# Patient Record
Sex: MALE | Race: OTHER | NOT HISPANIC OR LATINO | Employment: OTHER | URBAN - METROPOLITAN AREA
[De-identification: names, ages, dates, MRNs, and addresses within clinical notes are randomized per-mention and may not be internally consistent; named-entity substitution may affect disease eponyms.]

---

## 2022-04-28 DIAGNOSIS — G20.A1 PARKINSON'S DISEASE: Primary | ICD-10-CM

## 2022-08-24 NOTE — PROGRESS NOTES
[facility] Neurology initial Office Visit Note    Initial Visit Date:  August 25, 2022  Current Visit Date:  08/25/2022    Chief Complaint:     Chief Complaint   Patient presents with    Tremors     No complaints       History of Present Illness:      This is a 67 y.o. Right hand dominant male with history of alcohol dependence who is referred for abnormal movements.    Age of Onset: 65 years old     Description of movements: right arm resting tremor along with use followed by left arm. Family noticed that he had been shuffling his feet for at least 2 years.     Exacerbation factors: denied     Relieving factors: calm.     Associated Symptoms:  Changes in smell or taste: No   Dysphagia: No   Voice/phonation change: Yes shaky    Changes in gait/falls:  Yes as above    Orthostatic hypotension:  No   Sleep disorder: Yes would toss and turn   Visual Hallucinations/Delusions: No   Mood disturbance: Yes Anxiety disorder.    Cognitive decline: No    Handwriting changes: Yes erratic     Trouble using a fork: No   Trouble using a spoon: No   Trouble drinking out of a cup: Yes occasional      Risk Factors:   Family history of movement disorder: No   Cardiovascular risk factors: Not Applicable   CNS Focal lesions:No   Mood disorder: Yes Anxiety disorder   Antipsychotics/Mood stabilizer/Antidepressant/Traditional antiemetic exposure: No   Stimulant use: No    Tobacco use: Yes 1/2 ppd since age of 22 years old. Quit smoking around 9 months ago.   Alcohol use: Yes History of alcohol dependence. Had been sober for 1 year    Recreational drug use: No    Herbicide exposure: No   Agent Orange exposure: Not Applicable     Medications:     Current:   Valium 5 mg BID     Prior:   non    Devices/Interventions:     DBS:   Gamma knife/Radiofrequency ablation:   PT/OT:     Labs:     No results found for this or any previous visit.    Studies:      Imaging:   MRI Brain:     Genetic Testing: Not Applicable      Review of Systems:     Review of  Systems   All other systems reviewed and are negative.      Physical Exams:     Vitals:    08/25/22 1059   BP: 131/67   Pulse: 90   Resp: 20   Temp: 98.2 °F (36.8 °C)       Physical Exam  Vitals and nursing note reviewed.   Constitutional:       Appearance: Normal appearance.   HENT:      Head: Normocephalic and atraumatic.      Nose: Nose normal.      Mouth/Throat:      Mouth: Mucous membranes are moist.      Pharynx: Oropharynx is clear.   Eyes:      Conjunctiva/sclera: Conjunctivae normal.   Cardiovascular:      Rate and Rhythm: Normal rate and regular rhythm.      Pulses: Normal pulses.      Heart sounds: Normal heart sounds.   Pulmonary:      Effort: Pulmonary effort is normal.      Breath sounds: Normal breath sounds.   Abdominal:      General: Abdomen is flat.      Palpations: Abdomen is soft.   Musculoskeletal:         General: Normal range of motion.      Cervical back: Normal range of motion.   Neurological:      Mental Status: He is alert.   Psychiatric:         Mood and Affect: Mood normal.         Comprehensive Neurological Exam:  Mental Status: Alert Oriented to Self, Date, and Place.   CN II - XII: KYLEIGH, No APD, Fundus wnl OU, VA grossly intact to finger counting at 6 ft, VFFC, No ptosis OU, EOMI without nystagmus, LT/Temp symmetric in CN V1-3 distribution, Hearing grossly intact, Face Symmetric, Tongue and Uvula midline, Trapezius symmetric bilateral. + Hypomimia.   Motor: rigidity in RUE>LUE, RLE tone, and bulk wnl throughout, Resting pill rolling tremor RUE>LUE, RLE, postural re-emergence tremor in RUE at 30 seconds,  5/5 to confrontation, Bradykinetic bilateral UE>LE, No pronator drift.   Sensory: LT, Proprioception, Vibration, PP, Temp symmetric.   Reflexes: 1+ throughout  Cerebellar: FNF wnl b/l  Romberg: Negative  Gait: decreased ground clearance and stride. Decreased arm swing bilaterally. 5 step en bloc turn.     Assessment:     This is a 67 y.o. Right hand dominant male with history of  alcohol dependence who is referred for PD    Problem List Items Addressed This Visit        Neuro    Parkinson's disease - Primary (Chronic)    Relevant Medications    carbidopa-levodopa  mg (SINEMET)  mg per tablet    carbidopa-levodopa  mg (SINEMET)  mg per tablet (Start on 10/7/2022)          Plan:     [] start Sinemet 25 mg - 100 mg 1 tablet TID with titration.   [] Patient and family counseled about possible worsening impulse control issues with initiation of dopaminergic medications, especially in patient with history of addictions.  Family is made aware of possible issues with worsening addictions and developing new additions.  Family is also made aware of possible visual hallucinations while on dopaminergic medications.    RTC 3 months    I have explained the treatment plan, diagnosis, and prognosis to patient. All questions are answered to the best of my knowledge.     Face to face time 45 minutes, including documentation, chart review, counseling, education, review of test results, relevant medical records, and coordination of care.       Shobha Chery MD   General Neurology  08/25/2022

## 2022-08-25 ENCOUNTER — OFFICE VISIT (OUTPATIENT)
Dept: NEUROLOGY | Facility: CLINIC | Age: 67
End: 2022-08-25
Payer: MEDICARE

## 2022-08-25 VITALS
HEIGHT: 63 IN | BODY MASS INDEX: 18.54 KG/M2 | HEART RATE: 90 BPM | WEIGHT: 104.63 LBS | RESPIRATION RATE: 20 BRPM | SYSTOLIC BLOOD PRESSURE: 131 MMHG | DIASTOLIC BLOOD PRESSURE: 67 MMHG | OXYGEN SATURATION: 97 % | TEMPERATURE: 98 F

## 2022-08-25 DIAGNOSIS — G20.A1 PARKINSON'S DISEASE: Primary | ICD-10-CM

## 2022-08-25 PROCEDURE — 3008F PR BODY MASS INDEX (BMI) DOCUMENTED: ICD-10-PCS | Mod: CPTII,,, | Performed by: PSYCHIATRY & NEUROLOGY

## 2022-08-25 PROCEDURE — 3008F BODY MASS INDEX DOCD: CPT | Mod: CPTII,,, | Performed by: PSYCHIATRY & NEUROLOGY

## 2022-08-25 PROCEDURE — 99214 OFFICE O/P EST MOD 30 MIN: CPT | Mod: PBBFAC | Performed by: PSYCHIATRY & NEUROLOGY

## 2022-08-25 PROCEDURE — 1126F PR PAIN SEVERITY QUANTIFIED, NO PAIN PRESENT: ICD-10-PCS | Mod: CPTII,,, | Performed by: PSYCHIATRY & NEUROLOGY

## 2022-08-25 PROCEDURE — 3288F FALL RISK ASSESSMENT DOCD: CPT | Mod: CPTII,,, | Performed by: PSYCHIATRY & NEUROLOGY

## 2022-08-25 PROCEDURE — 3078F PR MOST RECENT DIASTOLIC BLOOD PRESSURE < 80 MM HG: ICD-10-PCS | Mod: CPTII,,, | Performed by: PSYCHIATRY & NEUROLOGY

## 2022-08-25 PROCEDURE — 99204 OFFICE O/P NEW MOD 45 MIN: CPT | Mod: S$PBB,,, | Performed by: PSYCHIATRY & NEUROLOGY

## 2022-08-25 PROCEDURE — 1101F PT FALLS ASSESS-DOCD LE1/YR: CPT | Mod: CPTII,,, | Performed by: PSYCHIATRY & NEUROLOGY

## 2022-08-25 PROCEDURE — 1159F MED LIST DOCD IN RCRD: CPT | Mod: CPTII,,, | Performed by: PSYCHIATRY & NEUROLOGY

## 2022-08-25 PROCEDURE — 3075F PR MOST RECENT SYSTOLIC BLOOD PRESS GE 130-139MM HG: ICD-10-PCS | Mod: CPTII,,, | Performed by: PSYCHIATRY & NEUROLOGY

## 2022-08-25 PROCEDURE — 99204 PR OFFICE/OUTPT VISIT, NEW, LEVL IV, 45-59 MIN: ICD-10-PCS | Mod: S$PBB,,, | Performed by: PSYCHIATRY & NEUROLOGY

## 2022-08-25 PROCEDURE — 3075F SYST BP GE 130 - 139MM HG: CPT | Mod: CPTII,,, | Performed by: PSYCHIATRY & NEUROLOGY

## 2022-08-25 PROCEDURE — 3078F DIAST BP <80 MM HG: CPT | Mod: CPTII,,, | Performed by: PSYCHIATRY & NEUROLOGY

## 2022-08-25 PROCEDURE — 1101F PR PT FALLS ASSESS DOC 0-1 FALLS W/OUT INJ PAST YR: ICD-10-PCS | Mod: CPTII,,, | Performed by: PSYCHIATRY & NEUROLOGY

## 2022-08-25 PROCEDURE — 1159F PR MEDICATION LIST DOCUMENTED IN MEDICAL RECORD: ICD-10-PCS | Mod: CPTII,,, | Performed by: PSYCHIATRY & NEUROLOGY

## 2022-08-25 PROCEDURE — 1126F AMNT PAIN NOTED NONE PRSNT: CPT | Mod: CPTII,,, | Performed by: PSYCHIATRY & NEUROLOGY

## 2022-08-25 PROCEDURE — 3288F PR FALLS RISK ASSESSMENT DOCUMENTED: ICD-10-PCS | Mod: CPTII,,, | Performed by: PSYCHIATRY & NEUROLOGY

## 2022-08-25 RX ORDER — ATORVASTATIN CALCIUM 80 MG/1
80 TABLET, FILM COATED ORAL NIGHTLY
COMMUNITY
Start: 2022-06-06

## 2022-08-25 RX ORDER — CARBIDOPA AND LEVODOPA 25; 100 MG/1; MG/1
1 TABLET ORAL 3 TIMES DAILY
Qty: 90 TABLET | Refills: 3 | Status: SHIPPED | OUTPATIENT
Start: 2022-10-07 | End: 2022-12-01 | Stop reason: SDUPTHER

## 2022-08-25 RX ORDER — PANTOPRAZOLE SODIUM 40 MG/1
40 TABLET, DELAYED RELEASE ORAL EVERY MORNING
COMMUNITY
Start: 2022-08-01

## 2022-08-25 RX ORDER — DIAZEPAM 5 MG/1
5 TABLET ORAL DAILY
COMMUNITY
Start: 2022-08-10

## 2022-08-25 RX ORDER — PLECANATIDE 3 MG/1
1 TABLET ORAL DAILY
COMMUNITY
Start: 2022-08-03

## 2022-08-25 RX ORDER — AMOXICILLIN AND CLAVULANATE POTASSIUM 875; 125 MG/1; MG/1
TABLET, FILM COATED ORAL
COMMUNITY
Start: 2022-03-07 | End: 2022-12-01

## 2022-08-25 RX ORDER — SUCRALFATE 1 G/1
1 TABLET ORAL 4 TIMES DAILY
COMMUNITY
Start: 2022-03-21 | End: 2022-12-01

## 2022-08-25 RX ORDER — CARBIDOPA AND LEVODOPA 25; 100 MG/1; MG/1
TABLET ORAL
Qty: 118 TABLET | Refills: 0 | Status: SHIPPED | OUTPATIENT
Start: 2022-08-25 | End: 2022-10-08

## 2022-11-30 NOTE — PROGRESS NOTES
[facility] Neurology Follow Up Office Visit Note    Initial Visit Date: 8/25/2022  Last Visit Date: 8/25/2022  Current Visit Date:  12/01/2022    Chief Complaint:     Chief Complaint   Patient presents with    Parkinson's Disease     Tremors continue, but patient feels that symptoms are not worsening.       History of Present Illness:      This is a 67 y.o. Right hand dominant male with history of alcohol dependence who is referred for Parkinson's disease.  During last visit, Sinemet 25 mg-100 mg 1 tablet 3 times a day was started with titration.  Patient and family was counseled regarding possible worsening impulse control issues with initiation of dopaminergic medications, especially in patient with history of addictions. No hallucinations. Tremor is stable.     Age of Onset: 65 years old      Description of movements: right arm resting tremor along with use followed by left arm. Family noticed that he had been shuffling his feet for at least 2 years.      Exacerbation factors: denied      Relieving factors: calm.      Associated Symptoms:  Changes in smell or taste: No   Dysphagia: No   Voice/phonation change: Yes shaky    Changes in gait/falls:  Yes as above    Orthostatic hypotension:  No   Sleep disorder: Yes would toss and turn   Visual Hallucinations/Delusions: No   Mood disturbance: Yes Anxiety disorder.    Cognitive decline: No    Handwriting changes: Yes erratic     Trouble using a fork: No   Trouble using a spoon: No   Trouble drinking out of a cup: Yes occasional       Risk Factors:   Family history of movement disorder: No   Cardiovascular risk factors: Not Applicable   CNS Focal lesions:No   Mood disorder: Yes Anxiety disorder   Antipsychotics/Mood stabilizer/Antidepressant/Traditional antiemetic exposure: No   Stimulant use: No    Tobacco use: Yes 1/2 ppd since age of 22 years old. Quit smoking around 9 months ago.   Alcohol use: Yes History of alcohol dependence. Had been sober for 1 year    Recreational  drug use: No    Herbicide exposure: No   Agent Orange exposure: Not Applicable     Medications:     Current:   Valium 5 mg BID   Sinemet instant release 25 mg-100 mg 1 tablet 3 times a day (August 25, 2022 to present): only taking twice a day     Prior:   none    Devices/Interventions:     DBS:   Gamma knife/Radiofrequency ablation:   PT/OT:     Labs:     No results found for this or any previous visit.    Studies:      Imaging:   MRI Brain:     Genetic Testing: Not Applicable      Review of Systems:     All other systems reviewed and are negative.    Physical Exams:     Vitals:    12/01/22 0953   BP: 100/64   Pulse: 93   Resp: 20   Temp: 98.9 °F (37.2 °C)       Vitals and nursing note reviewed.   Constitutional:       Appearance: Normal appearance.   HENT:      Head: Normocephalic and atraumatic.      Nose: Nose normal.      Mouth/Throat:      Mouth: Mucous membranes are moist.      Pharynx: Oropharynx is clear.   Eyes:      Conjunctiva/sclera: Conjunctivae normal.   Cardiovascular:      Rate and Rhythm: Normal rate and regular rhythm.      Pulses: Normal pulses.      Heart sounds: Normal heart sounds.   Pulmonary:      Effort: Pulmonary effort is normal.      Breath sounds: Normal breath sounds.   Abdominal:      General: Abdomen is flat.      Palpations: Abdomen is soft.   Musculoskeletal:         General: Normal range of motion.      Cervical back: Normal range of motion.   Neurological:      Mental Status: He is alert.   Psychiatric:         Mood and Affect: Mood normal.            Comprehensive Neurological Exam:  Mental Status: Alert Oriented to Self, Date, and Place.   CN II - XII: KYLEIGH, No APD, Fundus wnl OU, VA grossly intact to finger counting at 6 ft, VFFC, No ptosis OU, EOMI without nystagmus, LT/Temp symmetric in CN V1-3 distribution, Hearing grossly intact, Face Symmetric, Tongue and Uvula midline, Trapezius symmetric bilateral. + Hypomimia.   Motor: rigidity in RUE>LUE, RLE tone, and bulk wnl  throughout, Resting pill rolling tremor RUE>LUE, RLE, postural re-emergence tremor in RUE at 30 seconds,  5/5 to confrontation, Bradykinetic bilateral UE>LE, No pronator drift.   Sensory: LT, Proprioception, Vibration, PP, Temp symmetric.   Reflexes: 1+ throughout  Cerebellar: FNF wnl b/l  Romberg: Negative  Gait: decreased ground clearance and stride. Decreased arm swing bilaterally. 5 step en bloc turn.     Assessment:     This is a 67 y.o. Right hand dominant male with history of alcohol dependence who is referred for PD       Problem List Items Addressed This Visit          Neuro    Parkinson's disease - Primary (Chronic)       Plan:     [] increase Sinemet to 25 mg - 100 mg 1 tablet three times a day for 4 weeks, then increase to 2 tablet in the morning, 1 tablet at noon, and 1 tablet in the afternoon      RTC 3 months     I have explained the treatment plan, diagnosis, and prognosis to patient. All questions are answered to the best of my knowledge.     Face to face time 40 minutes, including documentation, chart review, counseling, education, review of test results, relevant medical records, and coordination of care.       Shobha Chery MD   General Neurology  12/01/2022

## 2022-12-01 ENCOUNTER — OFFICE VISIT (OUTPATIENT)
Dept: NEUROLOGY | Facility: CLINIC | Age: 67
End: 2022-12-01
Payer: MEDICARE

## 2022-12-01 VITALS
SYSTOLIC BLOOD PRESSURE: 100 MMHG | DIASTOLIC BLOOD PRESSURE: 64 MMHG | RESPIRATION RATE: 20 BRPM | WEIGHT: 102.81 LBS | BODY MASS INDEX: 18.21 KG/M2 | HEIGHT: 63 IN | OXYGEN SATURATION: 98 % | HEART RATE: 93 BPM | TEMPERATURE: 99 F

## 2022-12-01 DIAGNOSIS — G20.A1 PARKINSON'S DISEASE: Primary | ICD-10-CM

## 2022-12-01 PROCEDURE — 1101F PT FALLS ASSESS-DOCD LE1/YR: CPT | Mod: CPTII,,, | Performed by: PSYCHIATRY & NEUROLOGY

## 2022-12-01 PROCEDURE — 99215 PR OFFICE/OUTPT VISIT, EST, LEVL V, 40-54 MIN: ICD-10-PCS | Mod: S$PBB,,, | Performed by: PSYCHIATRY & NEUROLOGY

## 2022-12-01 PROCEDURE — 1159F PR MEDICATION LIST DOCUMENTED IN MEDICAL RECORD: ICD-10-PCS | Mod: CPTII,,, | Performed by: PSYCHIATRY & NEUROLOGY

## 2022-12-01 PROCEDURE — 3078F DIAST BP <80 MM HG: CPT | Mod: CPTII,,, | Performed by: PSYCHIATRY & NEUROLOGY

## 2022-12-01 PROCEDURE — 3074F SYST BP LT 130 MM HG: CPT | Mod: CPTII,,, | Performed by: PSYCHIATRY & NEUROLOGY

## 2022-12-01 PROCEDURE — 1101F PR PT FALLS ASSESS DOC 0-1 FALLS W/OUT INJ PAST YR: ICD-10-PCS | Mod: CPTII,,, | Performed by: PSYCHIATRY & NEUROLOGY

## 2022-12-01 PROCEDURE — 3074F PR MOST RECENT SYSTOLIC BLOOD PRESSURE < 130 MM HG: ICD-10-PCS | Mod: CPTII,,, | Performed by: PSYCHIATRY & NEUROLOGY

## 2022-12-01 PROCEDURE — 99215 OFFICE O/P EST HI 40 MIN: CPT | Mod: S$PBB,,, | Performed by: PSYCHIATRY & NEUROLOGY

## 2022-12-01 PROCEDURE — 1159F MED LIST DOCD IN RCRD: CPT | Mod: CPTII,,, | Performed by: PSYCHIATRY & NEUROLOGY

## 2022-12-01 PROCEDURE — 1126F AMNT PAIN NOTED NONE PRSNT: CPT | Mod: CPTII,,, | Performed by: PSYCHIATRY & NEUROLOGY

## 2022-12-01 PROCEDURE — 3008F PR BODY MASS INDEX (BMI) DOCUMENTED: ICD-10-PCS | Mod: CPTII,,, | Performed by: PSYCHIATRY & NEUROLOGY

## 2022-12-01 PROCEDURE — 99213 OFFICE O/P EST LOW 20 MIN: CPT | Mod: PBBFAC | Performed by: PSYCHIATRY & NEUROLOGY

## 2022-12-01 PROCEDURE — 3078F PR MOST RECENT DIASTOLIC BLOOD PRESSURE < 80 MM HG: ICD-10-PCS | Mod: CPTII,,, | Performed by: PSYCHIATRY & NEUROLOGY

## 2022-12-01 PROCEDURE — 3288F PR FALLS RISK ASSESSMENT DOCUMENTED: ICD-10-PCS | Mod: CPTII,,, | Performed by: PSYCHIATRY & NEUROLOGY

## 2022-12-01 PROCEDURE — 3288F FALL RISK ASSESSMENT DOCD: CPT | Mod: CPTII,,, | Performed by: PSYCHIATRY & NEUROLOGY

## 2022-12-01 PROCEDURE — 3008F BODY MASS INDEX DOCD: CPT | Mod: CPTII,,, | Performed by: PSYCHIATRY & NEUROLOGY

## 2022-12-01 PROCEDURE — 1126F PR PAIN SEVERITY QUANTIFIED, NO PAIN PRESENT: ICD-10-PCS | Mod: CPTII,,, | Performed by: PSYCHIATRY & NEUROLOGY

## 2022-12-01 RX ORDER — CARBIDOPA AND LEVODOPA 25; 100 MG/1; MG/1
TABLET ORAL
Qty: 120 TABLET | Refills: 4 | Status: SHIPPED | OUTPATIENT
Start: 2023-01-01 | End: 2022-12-06

## 2022-12-01 RX ORDER — CARBIDOPA AND LEVODOPA 25; 100 MG/1; MG/1
1 TABLET ORAL 3 TIMES DAILY
Qty: 90 TABLET | Refills: 0 | Status: SHIPPED | OUTPATIENT
Start: 2022-12-01 | End: 2022-12-31

## 2022-12-05 ENCOUNTER — TELEPHONE (OUTPATIENT)
Dept: NEUROLOGY | Facility: CLINIC | Age: 67
End: 2022-12-05
Payer: MEDICAID

## 2022-12-05 NOTE — TELEPHONE ENCOUNTER
Spoke to ronak with [facility] and explained--patient is to take carbidopa/levodopa 25-100mg TID for 4 weeks then increase to 2 tablets in am, 1 tablet at noon, and 1 tablet in the evening.    Ms Bess verbally repeated directions.

## 2022-12-05 NOTE — TELEPHONE ENCOUNTER
----- Message from Heavenly Dacosta sent at 12/5/2022  3:25 PM CST -----  Regarding: Med verification  [facility] [phone] called for medication clarification on rx CARBIDOPA LEVODOPA        Thank You  Rona SAM

## 2022-12-06 DIAGNOSIS — G20.A1 PARKINSON'S DISEASE: ICD-10-CM

## 2022-12-06 RX ORDER — CARBIDOPA AND LEVODOPA 25; 100 MG/1; MG/1
TABLET ORAL
Qty: 120 TABLET | Refills: 4 | Status: SHIPPED | OUTPATIENT
Start: 2022-12-06 | End: 2023-03-06

## 2023-03-05 DIAGNOSIS — G20.A1 PARKINSON'S DISEASE: ICD-10-CM

## 2023-03-06 RX ORDER — CARBIDOPA AND LEVODOPA 25; 100 MG/1; MG/1
TABLET ORAL
Qty: 120 TABLET | Refills: 4 | Status: SHIPPED | OUTPATIENT
Start: 2023-03-06 | End: 2023-03-07 | Stop reason: SDUPTHER

## 2023-03-06 NOTE — PROGRESS NOTES
[facility] Neurology Follow Up Office Visit Note    Initial Visit Date: 8/25/2022  Last Visit Date: 12/1/2022  Current Visit Date:  03/06/2023    Chief Complaint:     Chief Complaint   Patient presents with    Patient presents today with a hx of parkinson's disease    Patient's daughter states everything still the same, tremor    Patient daughter mentioned that he was hospitalized last mo       History of Present Illness:      This is a 67 y.o. Right hand dominant male with history of alcohol dependence who is referred for Parkinson's disease.  During last visit, Sinemet 25 mg-100 mg  was increased to 2 tab - 1 tab - 1 tab with titration.  Denied hallucinations.     Age of Onset: 65 years old      Description of movements: right arm resting tremor along with use followed by left arm. Family noticed that he had been shuffling his feet for at least 2 years. Now with leg involvement. Denied any hallucinations. Complaining of rigidity towards the end of the day.      Exacerbation factors: denied      Relieving factors: calm.      Associated Symptoms:  Changes in smell or taste: No   Dysphagia: No   Voice/phonation change: Yes shaky    Changes in gait/falls:  Yes as above    Orthostatic hypotension:  No   Sleep disorder: Yes would toss and turn   Visual Hallucinations/Delusions: No   Mood disturbance: Yes Anxiety disorder.    Cognitive decline: No    Handwriting changes: Yes erratic     Trouble using a fork: No   Trouble using a spoon: No   Trouble drinking out of a cup: Yes occasional       Risk Factors:   Family history of movement disorder: No   Cardiovascular risk factors: Not Applicable   CNS Focal lesions:No   Mood disorder: Yes Anxiety disorder   Antipsychotics/Mood stabilizer/Antidepressant/Traditional antiemetic exposure: No   Stimulant use: No    Tobacco use: Yes 1/2 ppd since age of 22 years old. Quit smoking around 9 months ago.   Alcohol use: Yes History of alcohol dependence. Had been sober for 1 year     Recreational drug use: No    Herbicide exposure: No   Agent Orange exposure: Not Applicable     Medications:     Current:   Valium 5 mg BID   Sinemet instant release 25 mg-100 mg 2 tablet in the morning, 1 tablet at noon, and 1 tablet in the afternoon (12/1/2022 to present)    Prior:   none    Devices/Interventions:     DBS:   Gamma knife/Radiofrequency ablation:   PT/OT:     Labs:     No results found for this or any previous visit.    Studies:      Imaging:   MRI Brain:     Genetic Testing: Not Applicable      Review of Systems:     All other systems reviewed and are negative.    Physical Exams:     Vitals:    03/07/23 1429   BP: (!) 154/77   Pulse: 86   Temp: 97.8 °F (36.6 °C)         Vitals and nursing note reviewed.   Constitutional:       Appearance: Normal appearance.   HENT:      Head: Normocephalic and atraumatic.      Nose: Nose normal.      Mouth/Throat:      Mouth: Mucous membranes are moist.      Pharynx: Oropharynx is clear.   Eyes:      Conjunctiva/sclera: Conjunctivae normal.   Cardiovascular:      Rate and Rhythm: Normal rate and regular rhythm.      Pulses: Normal pulses.      Heart sounds: Normal heart sounds.   Pulmonary:      Effort: Pulmonary effort is normal.      Breath sounds: Normal breath sounds.   Abdominal:      General: Abdomen is flat.      Palpations: Abdomen is soft.   Musculoskeletal:         General: Normal range of motion.      Cervical back: Normal range of motion.   Neurological:      Mental Status: He is alert.   Psychiatric:         Mood and Affect: Mood normal.            Comprehensive Neurological Exam:  Mental Status: Alert Oriented to Self, Date, and Place.   CN II - XII: KYLEIGH, No APD, Fundus wnl OU, VA grossly intact to finger counting at 6 ft, VFFC, No ptosis OU, EOMI without nystagmus, LT/Temp symmetric in CN V1-3 distribution, Hearing grossly intact, Face Symmetric, Tongue and Uvula midline, Trapezius symmetric bilateral. + Hypomimia.   Motor: rigidity in RUE>LUE,  RLE tone, and bulk wnl throughout, Resting pill rolling tremor RUE>LUE, RLE, postural re-emergence tremor in RUE at 30 seconds,  5/5 to confrontation, Bradykinetic bilateral UE>LE, No pronator drift.   Sensory: LT, Proprioception, Vibration, PP, Temp symmetric.   Reflexes: 1+ throughout  Cerebellar: FNF wnl b/l  Romberg: Negative  Gait: decreased ground clearance and stride. Decreased arm swing bilaterally. 5 step en bloc turn.     Assessment:     This is a 67 y.o. Right hand dominant male with history of alcohol dependence who is referred for PD       Problem List Items Addressed This Visit          Neuro    Parkinson's disease - Primary (Chronic)       Plan:     [] increase Sinemet 25 mg - 100 mg 2 tablet to three times a day   [] start Selegiline 0.5 mg daily     RTC 3 months     I have explained the treatment plan, diagnosis, and prognosis to patient. All questions are answered to the best of my knowledge.     Face to face time 40 minutes, including documentation, chart review, counseling, education, review of test results, relevant medical records, and coordination of care.       Shobha Chery MD   General Neurology  03/07/2023

## 2023-03-07 ENCOUNTER — OFFICE VISIT (OUTPATIENT)
Dept: NEUROLOGY | Facility: CLINIC | Age: 68
End: 2023-03-07
Payer: MEDICARE

## 2023-03-07 VITALS
HEIGHT: 63 IN | BODY MASS INDEX: 17.96 KG/M2 | DIASTOLIC BLOOD PRESSURE: 77 MMHG | OXYGEN SATURATION: 98 % | WEIGHT: 101.38 LBS | SYSTOLIC BLOOD PRESSURE: 154 MMHG | HEART RATE: 86 BPM | TEMPERATURE: 98 F

## 2023-03-07 DIAGNOSIS — G20.A1 PARKINSON'S DISEASE: Primary | ICD-10-CM

## 2023-03-07 PROCEDURE — 1125F PR PAIN SEVERITY QUANTIFIED, PAIN PRESENT: ICD-10-PCS | Mod: CPTII,,, | Performed by: PSYCHIATRY & NEUROLOGY

## 2023-03-07 PROCEDURE — 1159F MED LIST DOCD IN RCRD: CPT | Mod: CPTII,,, | Performed by: PSYCHIATRY & NEUROLOGY

## 2023-03-07 PROCEDURE — 1125F AMNT PAIN NOTED PAIN PRSNT: CPT | Mod: CPTII,,, | Performed by: PSYCHIATRY & NEUROLOGY

## 2023-03-07 PROCEDURE — 99215 PR OFFICE/OUTPT VISIT, EST, LEVL V, 40-54 MIN: ICD-10-PCS | Mod: S$PBB,,, | Performed by: PSYCHIATRY & NEUROLOGY

## 2023-03-07 PROCEDURE — 3077F PR MOST RECENT SYSTOLIC BLOOD PRESSURE >= 140 MM HG: ICD-10-PCS | Mod: CPTII,,, | Performed by: PSYCHIATRY & NEUROLOGY

## 2023-03-07 PROCEDURE — 3078F PR MOST RECENT DIASTOLIC BLOOD PRESSURE < 80 MM HG: ICD-10-PCS | Mod: CPTII,,, | Performed by: PSYCHIATRY & NEUROLOGY

## 2023-03-07 PROCEDURE — 99215 OFFICE O/P EST HI 40 MIN: CPT | Mod: S$PBB,,, | Performed by: PSYCHIATRY & NEUROLOGY

## 2023-03-07 PROCEDURE — 99213 OFFICE O/P EST LOW 20 MIN: CPT | Mod: PBBFAC | Performed by: PSYCHIATRY & NEUROLOGY

## 2023-03-07 PROCEDURE — 3078F DIAST BP <80 MM HG: CPT | Mod: CPTII,,, | Performed by: PSYCHIATRY & NEUROLOGY

## 2023-03-07 PROCEDURE — 3008F BODY MASS INDEX DOCD: CPT | Mod: CPTII,,, | Performed by: PSYCHIATRY & NEUROLOGY

## 2023-03-07 PROCEDURE — 1159F PR MEDICATION LIST DOCUMENTED IN MEDICAL RECORD: ICD-10-PCS | Mod: CPTII,,, | Performed by: PSYCHIATRY & NEUROLOGY

## 2023-03-07 PROCEDURE — 3077F SYST BP >= 140 MM HG: CPT | Mod: CPTII,,, | Performed by: PSYCHIATRY & NEUROLOGY

## 2023-03-07 PROCEDURE — 3008F PR BODY MASS INDEX (BMI) DOCUMENTED: ICD-10-PCS | Mod: CPTII,,, | Performed by: PSYCHIATRY & NEUROLOGY

## 2023-03-07 RX ORDER — ASPIRIN 325 MG
325 TABLET, DELAYED RELEASE (ENTERIC COATED) ORAL EVERY MORNING
COMMUNITY
Start: 2023-01-29

## 2023-03-07 RX ORDER — SELEGILINE HYDROCHLORIDE 5 MG/1
5 TABLET ORAL
Qty: 90 TABLET | Refills: 1 | Status: SHIPPED | OUTPATIENT
Start: 2023-03-07 | End: 2023-07-11 | Stop reason: SDUPTHER

## 2023-03-07 RX ORDER — CARBIDOPA AND LEVODOPA 25; 100 MG/1; MG/1
2 TABLET ORAL 3 TIMES DAILY
Qty: 540 TABLET | Refills: 1 | Status: SHIPPED | OUTPATIENT
Start: 2023-03-07 | End: 2023-06-03

## 2023-06-03 DIAGNOSIS — G20.A1 PARKINSON'S DISEASE: ICD-10-CM

## 2023-06-03 RX ORDER — CARBIDOPA AND LEVODOPA 25; 100 MG/1; MG/1
TABLET ORAL
Qty: 120 TABLET | Refills: 4 | Status: SHIPPED | OUTPATIENT
Start: 2023-06-03 | End: 2023-07-11

## 2023-07-10 NOTE — PROGRESS NOTES
[facility] Neurology Follow Up Office Visit Note    Initial Visit Date: 8/25/2022  Last Visit Date: 3/6/2023  Current Visit Date:  07/10/2023    Chief Complaint:     No chief complaint on file.      History of Present Illness:      This is a 68 y.o. Right hand dominant male with history of alcohol dependence who is referred for Parkinson's disease.  During last visit, Sinemet 25 mg-100 mg  was increased to 2 tablets 3 times a day.  Selegiline 5 mg daily was started.    Age of Onset: 65 years old      Description of movements: right arm resting tremor along with use followed by left arm. Family noticed that he had been shuffling his feet for at least 2 years. Now with leg involvement. Denied any hallucinations. Complaining of rigidity towards the end of the day.      Exacerbation factors: denied      Relieving factors: calm.      Associated Symptoms:  Changes in smell or taste: No   Dysphagia: No   Voice/phonation change: Yes shaky    Changes in gait/falls:  Yes as above    Orthostatic hypotension:  No   Sleep disorder: Yes would toss and turn   Visual Hallucinations/Delusions: No   Mood disturbance: Yes Anxiety disorder.    Cognitive decline: No    Handwriting changes: Yes erratic     Trouble using a fork: No   Trouble using a spoon: No   Trouble drinking out of a cup: Yes occasional       Risk Factors:   Family history of movement disorder: No   Cardiovascular risk factors: Not Applicable   CNS Focal lesions:No   Mood disorder: Yes Anxiety disorder   Antipsychotics/Mood stabilizer/Antidepressant/Traditional antiemetic exposure: No   Stimulant use: No    Tobacco use: Yes 1/2 ppd since age of 22 years old. Quit smoking around 9 months ago.   Alcohol use: Yes History of alcohol dependence. Had been sober for 1 year    Recreational drug use: No    Herbicide exposure: No   Agent Orange exposure: Not Applicable     Medications:     Current:   Valium 5 mg BID   Sinemet instant release 25 mg-100 mg 2 tablet 3 times a day  (3/7/2023 to present)  Selegiline 5 mg daily (3/7/2023 to present)    Prior:   none    Devices/Interventions:     DBS:   Gamma knife/Radiofrequency ablation:   PT/OT:     Labs:     No results found for this or any previous visit.    Studies:      Imaging:   MRI Brain:     Genetic Testing: Not Applicable      Review of Systems:     All other systems reviewed and are negative.    Physical Exams:     There were no vitals filed for this visit.        Vitals and nursing note reviewed.   Constitutional:       Appearance: Normal appearance.   HENT:      Head: Normocephalic and atraumatic.      Nose: Nose normal.      Mouth/Throat:      Mouth: Mucous membranes are moist.      Pharynx: Oropharynx is clear.   Eyes:      Conjunctiva/sclera: Conjunctivae normal.   Cardiovascular:      Rate and Rhythm: Normal rate and regular rhythm.      Pulses: Normal pulses.      Heart sounds: Normal heart sounds.   Pulmonary:      Effort: Pulmonary effort is normal.      Breath sounds: Normal breath sounds.   Abdominal:      General: Abdomen is flat.      Palpations: Abdomen is soft.   Musculoskeletal:         General: Normal range of motion.      Cervical back: Normal range of motion.   Neurological:      Mental Status: He is alert.   Psychiatric:         Mood and Affect: Mood normal.            Comprehensive Neurological Exam:  Mental Status: Alert Oriented to Self, Date, and Place.   CN II - XII: KYLEIGH, No APD, Fundus wnl OU, VA grossly intact to finger counting at 6 ft, VFFC, No ptosis OU, EOMI without nystagmus, LT/Temp symmetric in CN V1-3 distribution, Hearing grossly intact, Face Symmetric, Tongue and Uvula midline, Trapezius symmetric bilateral. + Hypomimia.   Motor: rigidity in RUE>LUE, RLE tone, and bulk wnl throughout, Resting pill rolling tremor RUE>LUE, RLE, postural re-emergence tremor in RUE at 30 seconds,  5/5 to confrontation, Bradykinetic bilateral UE>LE, No pronator drift.   Sensory: LT, Proprioception, Vibration, PP, Temp  symmetric.   Reflexes: 1+ throughout  Cerebellar: FNF wnl b/l  Romberg: Negative  Gait: decreased ground clearance and stride. Decreased arm swing bilaterally. 5 step en bloc turn.     Assessment:     This is a 68 y.o. Right hand dominant male with history of alcohol dependence who is referred for PD       Problem List Items Addressed This Visit          Neuro    Parkinson's disease - Primary (Chronic)       Plan:     [] Sinemet 25 mg - 100 mg 2 tablet three times a day   [] Selegiline 0.5 mg daily     RTC 3 months     I have explained the treatment plan, diagnosis, and prognosis to patient. All questions are answered to the best of my knowledge.     Face to face time 40 minutes, including documentation, chart review, counseling, education, review of test results, relevant medical records, and coordination of care.       Shobha Chery MD   General Neurology  07/10/2023

## 2023-07-11 ENCOUNTER — OFFICE VISIT (OUTPATIENT)
Dept: NEUROLOGY | Facility: CLINIC | Age: 68
End: 2023-07-11
Payer: MEDICARE

## 2023-07-11 VITALS
HEIGHT: 63 IN | BODY MASS INDEX: 17.96 KG/M2 | OXYGEN SATURATION: 98 % | TEMPERATURE: 98 F | DIASTOLIC BLOOD PRESSURE: 76 MMHG | HEART RATE: 90 BPM | WEIGHT: 101.38 LBS | SYSTOLIC BLOOD PRESSURE: 116 MMHG

## 2023-07-11 DIAGNOSIS — G20.A1 PARKINSON'S DISEASE: Primary | ICD-10-CM

## 2023-07-11 PROCEDURE — 3288F FALL RISK ASSESSMENT DOCD: CPT | Mod: CPTII,,, | Performed by: PSYCHIATRY & NEUROLOGY

## 2023-07-11 PROCEDURE — 1126F PR PAIN SEVERITY QUANTIFIED, NO PAIN PRESENT: ICD-10-PCS | Mod: CPTII,,, | Performed by: PSYCHIATRY & NEUROLOGY

## 2023-07-11 PROCEDURE — 1159F PR MEDICATION LIST DOCUMENTED IN MEDICAL RECORD: ICD-10-PCS | Mod: CPTII,,, | Performed by: PSYCHIATRY & NEUROLOGY

## 2023-07-11 PROCEDURE — 3074F PR MOST RECENT SYSTOLIC BLOOD PRESSURE < 130 MM HG: ICD-10-PCS | Mod: CPTII,,, | Performed by: PSYCHIATRY & NEUROLOGY

## 2023-07-11 PROCEDURE — 99214 OFFICE O/P EST MOD 30 MIN: CPT | Mod: S$PBB,GC,, | Performed by: PSYCHIATRY & NEUROLOGY

## 2023-07-11 PROCEDURE — 3074F SYST BP LT 130 MM HG: CPT | Mod: CPTII,,, | Performed by: PSYCHIATRY & NEUROLOGY

## 2023-07-11 PROCEDURE — 1160F PR REVIEW ALL MEDS BY PRESCRIBER/CLIN PHARMACIST DOCUMENTED: ICD-10-PCS | Mod: CPTII,,, | Performed by: PSYCHIATRY & NEUROLOGY

## 2023-07-11 PROCEDURE — 3008F BODY MASS INDEX DOCD: CPT | Mod: CPTII,,, | Performed by: PSYCHIATRY & NEUROLOGY

## 2023-07-11 PROCEDURE — 99213 OFFICE O/P EST LOW 20 MIN: CPT | Mod: PBBFAC | Performed by: PSYCHIATRY & NEUROLOGY

## 2023-07-11 PROCEDURE — 3008F PR BODY MASS INDEX (BMI) DOCUMENTED: ICD-10-PCS | Mod: CPTII,,, | Performed by: PSYCHIATRY & NEUROLOGY

## 2023-07-11 PROCEDURE — 3078F DIAST BP <80 MM HG: CPT | Mod: CPTII,,, | Performed by: PSYCHIATRY & NEUROLOGY

## 2023-07-11 PROCEDURE — 1160F RVW MEDS BY RX/DR IN RCRD: CPT | Mod: CPTII,,, | Performed by: PSYCHIATRY & NEUROLOGY

## 2023-07-11 PROCEDURE — 99214 PR OFFICE/OUTPT VISIT, EST, LEVL IV, 30-39 MIN: ICD-10-PCS | Mod: S$PBB,GC,, | Performed by: PSYCHIATRY & NEUROLOGY

## 2023-07-11 PROCEDURE — 1101F PT FALLS ASSESS-DOCD LE1/YR: CPT | Mod: CPTII,,, | Performed by: PSYCHIATRY & NEUROLOGY

## 2023-07-11 PROCEDURE — 3288F PR FALLS RISK ASSESSMENT DOCUMENTED: ICD-10-PCS | Mod: CPTII,,, | Performed by: PSYCHIATRY & NEUROLOGY

## 2023-07-11 PROCEDURE — 1101F PR PT FALLS ASSESS DOC 0-1 FALLS W/OUT INJ PAST YR: ICD-10-PCS | Mod: CPTII,,, | Performed by: PSYCHIATRY & NEUROLOGY

## 2023-07-11 PROCEDURE — 1126F AMNT PAIN NOTED NONE PRSNT: CPT | Mod: CPTII,,, | Performed by: PSYCHIATRY & NEUROLOGY

## 2023-07-11 PROCEDURE — 1159F MED LIST DOCD IN RCRD: CPT | Mod: CPTII,,, | Performed by: PSYCHIATRY & NEUROLOGY

## 2023-07-11 PROCEDURE — 3078F PR MOST RECENT DIASTOLIC BLOOD PRESSURE < 80 MM HG: ICD-10-PCS | Mod: CPTII,,, | Performed by: PSYCHIATRY & NEUROLOGY

## 2023-07-11 RX ORDER — SELEGILINE HYDROCHLORIDE 5 MG/1
5 TABLET ORAL 2 TIMES DAILY WITH MEALS
Qty: 180 TABLET | Refills: 0 | Status: SHIPPED | OUTPATIENT
Start: 2023-07-11 | End: 2023-10-25 | Stop reason: SDUPTHER

## 2023-07-11 NOTE — PROGRESS NOTES
[facility] Neurology Follow Up Office Visit Note    Initial Visit Date: 8/25/2022  Last Visit Date: 3/6/2023  Current Visit Date:  07/11/2023    Chief Complaint:     Chief Complaint   Patient presents with    Patient presents today with a hx of Parkinson's disease     Patient's daughter states the carbidopa-levodopa made the patient extremely shaky and uncomfortable so he no longer takes it       History of Present Illness:      This is a 68 y.o. Right hand dominant male with history of alcohol dependence who is referred for Parkinson's disease.  During last visit, Sinemet 25 mg-100 mg  was increased to 2 tablets 3 times a day.  Selegiline 5 mg daily was started.    Here today for follow up. Reports feeling very uncomfortable and experiencing clonic type jerks and neck stiffness since Sinemet was increased. He self discontinued Sinemet completely.  Tolerating Selegiline 5mg daily without issue, did not take this AM. Daughter reports some improvement in rigidity and tremor since starting Selegiline.     Age of Onset: 65 years old      Description of movements: Right arm resting tremor along with use followed by left arm. Family noticed that he had been shuffling his feet for at least 2 years. Now with leg involvement. Denied any hallucinations. Complaining of rigidity towards the end of the day.      Exacerbation factors: denied      Relieving factors: calm.      Associated Symptoms:  Changes in smell or taste: No   Dysphagia: No   Voice/phonation change: Yes shaky    Changes in gait/falls:  Yes as above    Orthostatic hypotension:  No   Sleep disorder: Yes would toss and turn   Visual Hallucinations/Delusions: No   Mood disturbance: Yes Anxiety disorder.    Cognitive decline: No    Handwriting changes: Yes erratic     Trouble using a fork: No   Trouble using a spoon: No   Trouble drinking out of a cup: Yes occasional       Risk Factors:   Family history of movement disorder: No   Cardiovascular risk factors: Not  Applicable   CNS Focal lesions:No   Mood disorder: Yes Anxiety disorder   Antipsychotics/Mood stabilizer/Antidepressant/Traditional antiemetic exposure: No   Stimulant use: No    Tobacco use: Yes 1/2 ppd since age of 22 years old. Quit smoking around 9 months ago.   Alcohol use: Yes History of alcohol dependence. Had been sober for 1 year    Recreational drug use: No    Herbicide exposure: No   Agent Orange exposure: Not Applicable     Medications:     Current:   Valium 5 mg BID   Sinemet instant release 25 mg-100 mg 2 tablet 3 times a day (3/7/2023 to present)  Selegiline 5 mg daily (3/7/2023 to present)    Prior:   none    Devices/Interventions:     DBS:   Gamma knife/Radiofrequency ablation:   PT/OT:     Labs:     No results found for this or any previous visit.    Studies:      Imaging:   MRI Brain:     Genetic Testing: Not Applicable      Review of Systems:     All other systems reviewed and are negative.    Physical Exams:     Vitals:    07/11/23 0937   BP: 116/76   Pulse: 90   Temp: 98.3 °F (36.8 °C)     Vitals and nursing note reviewed.   Constitutional:       Appearance: Normal appearance.   HENT:      Head: Normocephalic and atraumatic.      Nose: Nose normal.      Mouth/Throat:      Mouth: Mucous membranes are moist.      Pharynx: Oropharynx is clear.   Eyes:      Conjunctiva/sclera: Conjunctivae normal.   Cardiovascular:      Rate and Rhythm: Normal rate and regular rhythm.      Pulses: Normal pulses.      Heart sounds: Normal heart sounds.   Pulmonary:      Effort: Pulmonary effort is normal.      Breath sounds: Normal breath sounds.   Abdominal:      General: Abdomen is flat.      Palpations: Abdomen is soft.   Musculoskeletal:         General: Normal range of motion.      Cervical back: Normal range of motion.   Neurological:      Mental Status: He is alert.   Psychiatric:         Mood and Affect: Mood normal.         Comprehensive Neurological Exam:  Mental Status: Alert Oriented to Self, Date, and  Place.   CN II - XII: KYLEIGH, No APD, Fundus wnl OU, VA grossly intact to finger counting at 6 ft, VFFC, No ptosis OU, EOMI without nystagmus, LT/Temp symmetric in CN V1-3 distribution, Hearing grossly intact, Face Symmetric, Tongue and Uvula midline, Trapezius symmetric bilateral. + Hypomimia.   Motor: rigidity in RUE>LUE, RLE tone, and bulk wnl throughout, Resting pill rolling tremor RUE>LUE, RLE, postural re-emergence tremor in RUE at 30 seconds,  5/5 to confrontation, Bradykinetic bilateral UE>LE, No pronator drift.   Sensory: LT, Proprioception, Vibration, PP symmetric.   Reflexes: 1+ throughout  Cerebellar: FNF wnl b/l  Romberg: Negative  Gait: Decreased ground clearance and stride. Decreased arm swing bilaterally. 5 step en bloc turn.     Assessment:     This is a 68 y.o. Right hand dominant male with history of alcohol dependence who is referred for PD     Problem List Items Addressed This Visit          Neuro    Parkinson's disease - Primary (Chronic)     Plan:     [] Discontinue Sinemet secondary to intolerance   [] Increase Selegiline to 5mg BID   - Discussed importance of medication compliance and appropriate use     RTC 3 months     Eduardo Villafana MD, PGY-4   [facility]

## 2023-09-01 DIAGNOSIS — G20.A1 PARKINSON'S DISEASE: ICD-10-CM

## 2023-09-01 RX ORDER — CARBIDOPA AND LEVODOPA 25; 100 MG/1; MG/1
2 TABLET ORAL 3 TIMES DAILY
Qty: 540 TABLET | Refills: 0 | Status: SHIPPED | OUTPATIENT
Start: 2023-09-01 | End: 2023-10-25

## 2023-10-23 NOTE — PROGRESS NOTES
[facility] Neurology Follow Up Office Visit Note    Initial Visit Date: 8/25/2022  Last Visit Date: 7/11/2023  Current Visit Date:  10/25/2023    Chief Complaint:     Chief Complaint   Patient presents with    Tremors     F/U Parkinson's-still taking Carbidopa/Levodopa; Daughter states tremor is the same       History of Present Illness:      This is a 68 y.o. Right hand dominant male with history of alcohol dependence who is referred for Parkinson's disease.  During last visit, Sinemet was discontinued due to hyperkinesia.   Selegiline was increased to 5 mg twice a day.    Age of Onset: 65 years old      Description of movements: Right arm resting tremor along with use followed by left arm. Family noticed that he had been shuffling his feet for at least 2 years. Now with leg involvement. Denied any hallucinations. Complaining of rigidity towards the end of the day.      Exacerbation factors: denied      Relieving factors: calm.      Associated Symptoms:  Changes in smell or taste: No   Dysphagia: No   Voice/phonation change: Yes shaky    Changes in gait/falls:  Yes as above    Orthostatic hypotension:  No   Sleep disorder: Yes would toss and turn   Visual Hallucinations/Delusions: No   Mood disturbance: Yes Anxiety disorder.    Cognitive decline: No    Handwriting changes: Yes erratic     Trouble using a fork: No   Trouble using a spoon: No   Trouble drinking out of a cup: Yes occasional       Risk Factors:   Family history of movement disorder: No   Cardiovascular risk factors: Not Applicable   CNS Focal lesions:No   Mood disorder: Yes Anxiety disorder   Antipsychotics/Mood stabilizer/Antidepressant/Traditional antiemetic exposure: No   Stimulant use: No    Tobacco use: Yes 1/2 ppd since age of 22 years old. Quit smoking around 9 months ago.   Alcohol use: Yes History of alcohol dependence. Had been sober for 1 year    Recreational drug use: No    Herbicide exposure: No   Agent Orange exposure: Not Applicable      Medications:     Current:   Valium 5 mg BID   Selegiline 5 mg twice a day (7/11/2023 to present)    Prior:   Sinemet instant release 25 mg-100 mg 2 tablet 3 times a day (3/7/2023 to 7/11/2023): hyperkinesia    Devices/Interventions:     DBS:   Gamma knife/Radiofrequency ablation:   PT/OT:     Labs:     No results found for this or any previous visit.    Studies:      Imaging:   MRI Brain:     Genetic Testing: Not Applicable      Review of Systems:     All other systems reviewed and are negative.    Physical Exams:     Vitals:    10/25/23 1040   BP: 120/77   Pulse: 105   Resp: 12   Temp: 98.2 °F (36.8 °C)       Vitals and nursing note reviewed.   Constitutional:       Appearance: Normal appearance.   HENT:      Head: Normocephalic and atraumatic.      Nose: Nose normal.      Mouth/Throat:      Mouth: Mucous membranes are moist.      Pharynx: Oropharynx is clear.   Eyes:      Conjunctiva/sclera: Conjunctivae normal.   Cardiovascular:      Rate and Rhythm: Normal rate and regular rhythm.      Pulses: Normal pulses.      Heart sounds: Normal heart sounds.   Pulmonary:      Effort: Pulmonary effort is normal.      Breath sounds: Normal breath sounds.   Abdominal:      General: Abdomen is flat.      Palpations: Abdomen is soft.   Musculoskeletal:         General: Normal range of motion.      Cervical back: Normal range of motion.   Neurological:      Mental Status: He is alert.   Psychiatric:         Mood and Affect: Mood normal.         Comprehensive Neurological Exam:  Mental Status: Alert Oriented to Self, Date, and Place.   CN II - XII: KYLEIGH, No APD, Fundus wnl OU, VA grossly intact to finger counting at 6 ft, VFFC, No ptosis OU, EOMI without nystagmus, LT/Temp symmetric in CN V1-3 distribution, Hearing grossly intact, Face Symmetric, Tongue and Uvula midline, Trapezius symmetric bilateral. + Hypomimia.   Motor: rigidity in RUE>LUE, RLE tone, and bulk wnl throughout, Resting pill rolling tremor RUE>LUE, RLE,  postural re-emergence tremor in RUE at 30 seconds,  5/5 to confrontation, Bradykinetic bilateral UE>LE, No pronator drift.   Sensory: LT, Proprioception, Vibration, PP symmetric.   Reflexes: 1+ throughout  Cerebellar: FNF wnl b/l  Romberg: Negative  Gait: Decreased ground clearance and stride. Decreased arm swing bilaterally. 5 step en bloc turn.     Assessment:     This is a 68 y.o. Right hand dominant male with history of alcohol dependence who is referred for PD     Problem List Items Addressed This Visit          Neuro    Parkinson's disease - Primary (Chronic)     Plan:     [] continue with Selegiline 5 mg twice a day   [] start Carbidopa - Levodopa IR 10 mg - 100 mg 1 tablet twice a day       RTC 3 Months     I have explained the treatment plan, diagnosis, and prognosis to patient. All questions are answered to the best of my knowledge.     Face to face time 30 minutes, including documentation, chart review, counseling, education, review of test results, relevant medical records, and coordination of care.

## 2023-10-25 ENCOUNTER — OFFICE VISIT (OUTPATIENT)
Dept: NEUROLOGY | Facility: CLINIC | Age: 68
End: 2023-10-25
Payer: MEDICARE

## 2023-10-25 VITALS
TEMPERATURE: 98 F | RESPIRATION RATE: 12 BRPM | BODY MASS INDEX: 17.54 KG/M2 | HEART RATE: 105 BPM | HEIGHT: 63 IN | DIASTOLIC BLOOD PRESSURE: 77 MMHG | WEIGHT: 99 LBS | OXYGEN SATURATION: 99 % | SYSTOLIC BLOOD PRESSURE: 120 MMHG

## 2023-10-25 DIAGNOSIS — G20.A1 PARKINSON'S DISEASE WITHOUT DYSKINESIA OR FLUCTUATING MANIFESTATIONS: Primary | ICD-10-CM

## 2023-10-25 PROCEDURE — 99214 OFFICE O/P EST MOD 30 MIN: CPT | Mod: S$PBB,,, | Performed by: PSYCHIATRY & NEUROLOGY

## 2023-10-25 PROCEDURE — 3078F DIAST BP <80 MM HG: CPT | Mod: CPTII,,, | Performed by: PSYCHIATRY & NEUROLOGY

## 2023-10-25 PROCEDURE — 3074F PR MOST RECENT SYSTOLIC BLOOD PRESSURE < 130 MM HG: ICD-10-PCS | Mod: CPTII,,, | Performed by: PSYCHIATRY & NEUROLOGY

## 2023-10-25 PROCEDURE — 3078F PR MOST RECENT DIASTOLIC BLOOD PRESSURE < 80 MM HG: ICD-10-PCS | Mod: CPTII,,, | Performed by: PSYCHIATRY & NEUROLOGY

## 2023-10-25 PROCEDURE — 1159F MED LIST DOCD IN RCRD: CPT | Mod: CPTII,,, | Performed by: PSYCHIATRY & NEUROLOGY

## 2023-10-25 PROCEDURE — 3008F BODY MASS INDEX DOCD: CPT | Mod: CPTII,,, | Performed by: PSYCHIATRY & NEUROLOGY

## 2023-10-25 PROCEDURE — 99214 PR OFFICE/OUTPT VISIT, EST, LEVL IV, 30-39 MIN: ICD-10-PCS | Mod: S$PBB,,, | Performed by: PSYCHIATRY & NEUROLOGY

## 2023-10-25 PROCEDURE — 3008F PR BODY MASS INDEX (BMI) DOCUMENTED: ICD-10-PCS | Mod: CPTII,,, | Performed by: PSYCHIATRY & NEUROLOGY

## 2023-10-25 PROCEDURE — 99213 OFFICE O/P EST LOW 20 MIN: CPT | Mod: PBBFAC | Performed by: PSYCHIATRY & NEUROLOGY

## 2023-10-25 PROCEDURE — 1159F PR MEDICATION LIST DOCUMENTED IN MEDICAL RECORD: ICD-10-PCS | Mod: CPTII,,, | Performed by: PSYCHIATRY & NEUROLOGY

## 2023-10-25 PROCEDURE — 3074F SYST BP LT 130 MM HG: CPT | Mod: CPTII,,, | Performed by: PSYCHIATRY & NEUROLOGY

## 2023-10-25 RX ORDER — SELEGILINE HYDROCHLORIDE 5 MG/1
5 TABLET ORAL 2 TIMES DAILY WITH MEALS
Qty: 180 TABLET | Refills: 1 | Status: SHIPPED | OUTPATIENT
Start: 2023-10-25 | End: 2024-02-29 | Stop reason: SDUPTHER

## 2023-10-25 RX ORDER — CARBIDOPA AND LEVODOPA 10; 100 MG/1; MG/1
1 TABLET ORAL 2 TIMES DAILY
Qty: 60 TABLET | Refills: 3 | Status: SHIPPED | OUTPATIENT
Start: 2023-10-25 | End: 2024-02-18

## 2023-10-25 RX ORDER — MEGESTROL ACETATE 40 MG/ML
200 SUSPENSION ORAL 2 TIMES DAILY
COMMUNITY
Start: 2023-09-27

## 2024-02-18 DIAGNOSIS — G20.A1 PARKINSON'S DISEASE WITHOUT DYSKINESIA OR FLUCTUATING MANIFESTATIONS: ICD-10-CM

## 2024-02-18 RX ORDER — CARBIDOPA AND LEVODOPA 10; 100 MG/1; MG/1
1 TABLET ORAL 2 TIMES DAILY
Qty: 60 TABLET | Refills: 3 | Status: SHIPPED | OUTPATIENT
Start: 2024-02-18 | End: 2024-02-29

## 2024-02-27 NOTE — PROGRESS NOTES
"[facility] Neurology Follow Up Office Visit Note    Initial Visit Date: 8/25/2022  Last Visit Date: 10/25/2023  Current Visit Date:  02/29/2024    Chief Complaint:     Chief Complaint   Patient presents with    Parkinson's       History of Present Illness:      This is a 68 y.o. Right hand dominant male with history of alcohol dependence who is referred for Parkinson's disease.  During last visit,  Carbidopa - Levodopa IR 10 mg - 100 mg 1 tablet twice a day  was started. Stopped Sinemet due to "tightness in the legs."    Age of Onset: 65 years old      Description of movements: Right arm resting tremor along with use followed by left arm. Family noticed that he had been shuffling his feet for at least 2 years. Now with leg involvement. Denied any hallucinations. Complaining of rigidity towards the end of the day.      Exacerbation factors: denied      Relieving factors: calm.      Associated Symptoms:  Changes in smell or taste: No   Dysphagia: No   Voice/phonation change: Yes shaky    Changes in gait/falls:  Yes as above    Orthostatic hypotension:  No   Sleep disorder: Yes would toss and turn   Visual Hallucinations/Delusions: No   Mood disturbance: Yes Anxiety disorder.    Cognitive decline: No    Handwriting changes: Yes erratic     Trouble using a fork: No   Trouble using a spoon: No   Trouble drinking out of a cup: Yes occasional       Risk Factors:   Family history of movement disorder: No   Cardiovascular risk factors: Not Applicable   CNS Focal lesions:No   Mood disorder: Yes Anxiety disorder   Antipsychotics/Mood stabilizer/Antidepressant/Traditional antiemetic exposure: No   Stimulant use: No    Tobacco use: Yes 1/2 ppd since age of 22 years old. Quit smoking around 9 months ago.   Alcohol use: Yes History of alcohol dependence. Had been sober for 1 year    Recreational drug use: No    Herbicide exposure: No   Agent Orange exposure: Not Applicable     Medications:     Current:   Valium 5 mg BID   Selegiline 5 " mg twice a day (7/11/2023 to present)  Carbidopa - Levodopa IR 10 mg - 100 mg 1 tablet twice a day (10/25/2023 to present): not taking     Prior:   Sinemet instant release 25 mg-100 mg 2 tablet 3 times a day (3/7/2023 to 7/11/2023): hyperkinesia    Devices/Interventions:     DBS:   Gamma knife/Radiofrequency ablation:   PT/OT:     Labs:     No results found for this or any previous visit.    Studies:      Imaging:   MRI Brain:     Genetic Testing: Not Applicable      Review of Systems:     All other systems reviewed and are negative.    Physical Exams:     Vitals:    02/29/24 0841   BP: (!) 144/80   Pulse:    Resp:    Temp:          Vitals and nursing note reviewed.   Constitutional:       Appearance: Normal appearance.   HENT:      Head: Normocephalic and atraumatic.      Nose: Nose normal.      Mouth/Throat:      Mouth: Mucous membranes are moist.      Pharynx: Oropharynx is clear.   Eyes:      Conjunctiva/sclera: Conjunctivae normal.   Cardiovascular:      Rate and Rhythm: Normal rate and regular rhythm.      Pulses: Normal pulses.      Heart sounds: Normal heart sounds.   Pulmonary:      Effort: Pulmonary effort is normal.      Breath sounds: Normal breath sounds.   Abdominal:      General: Abdomen is flat.      Palpations: Abdomen is soft.   Musculoskeletal:         General: Normal range of motion.      Cervical back: Normal range of motion.   Neurological:      Mental Status: He is alert.   Psychiatric:         Mood and Affect: Mood normal.         Comprehensive Neurological Exam:  Mental Status: Alert Oriented to Self, Date, and Place.   CN II - XII: KYLEIGH, No APD, Fundus wnl OU, VA grossly intact to finger counting at 6 ft, VFFC, No ptosis OU, EOMI without nystagmus, LT/Temp symmetric in CN V1-3 distribution, Hearing grossly intact, Face Symmetric, Tongue and Uvula midline, Trapezius symmetric bilateral. + Hypomimia.   Motor: rigidity in RUE>LUE, RLE tone, and bulk wnl throughout, Resting pill rolling tremor  RUE>LUE, RLE, postural re-emergence tremor in RUE at 30 seconds,  5/5 to confrontation, Bradykinetic bilateral UE>LE, No pronator drift.   Sensory: LT, Proprioception, Vibration, PP symmetric.   Reflexes: 1+ throughout  Cerebellar: FNF wnl b/l  Romberg: Negative  Gait: Decreased ground clearance and stride. Decreased arm swing bilaterally. 5 step en bloc turn.     Assessment:     This is a 68 y.o. Right hand dominant male with history of alcohol dependence who is referred for PD. Unable to tolerate Sinemet 10 mg - 100 mg.      Problem List Items Addressed This Visit          Neuro    Parkinson's disease - Primary (Chronic)     Plan:     [] continue with Selegiline 5 mg twice a day   [] start pramipexole 0.25 mg daily for 2 weeks then 0.25 mg twice a day thereafter   [] stop Sinemet 10 mg - 100 mg       RTC 3 Months     Visit today is associated with current or anticipated ongoing medical care related to this patient's single serious condition/complex condition as documented above.       I have explained the treatment plan, diagnosis, and prognosis to patient. All questions are answered to the best of my knowledge.     Face to face time 40 minutes, including documentation, chart review, counseling, education, review of test results, relevant medical records, and coordination of care.

## 2024-02-29 ENCOUNTER — OFFICE VISIT (OUTPATIENT)
Dept: NEUROLOGY | Facility: CLINIC | Age: 69
End: 2024-02-29
Payer: MEDICARE

## 2024-02-29 VITALS
TEMPERATURE: 98 F | HEIGHT: 63 IN | BODY MASS INDEX: 18.39 KG/M2 | SYSTOLIC BLOOD PRESSURE: 144 MMHG | RESPIRATION RATE: 12 BRPM | HEART RATE: 103 BPM | WEIGHT: 103.81 LBS | OXYGEN SATURATION: 99 % | DIASTOLIC BLOOD PRESSURE: 80 MMHG

## 2024-02-29 DIAGNOSIS — G20.A1 PARKINSON'S DISEASE WITHOUT DYSKINESIA OR FLUCTUATING MANIFESTATIONS: Primary | ICD-10-CM

## 2024-02-29 DIAGNOSIS — G20.A1 PARKINSON'S DISEASE WITHOUT DYSKINESIA OR FLUCTUATING MANIFESTATIONS: ICD-10-CM

## 2024-02-29 PROCEDURE — G2211 COMPLEX E/M VISIT ADD ON: HCPCS | Mod: S$PBB,,, | Performed by: PSYCHIATRY & NEUROLOGY

## 2024-02-29 PROCEDURE — 3079F DIAST BP 80-89 MM HG: CPT | Mod: CPTII,,, | Performed by: PSYCHIATRY & NEUROLOGY

## 2024-02-29 PROCEDURE — 3077F SYST BP >= 140 MM HG: CPT | Mod: CPTII,,, | Performed by: PSYCHIATRY & NEUROLOGY

## 2024-02-29 PROCEDURE — 99214 OFFICE O/P EST MOD 30 MIN: CPT | Mod: PBBFAC | Performed by: PSYCHIATRY & NEUROLOGY

## 2024-02-29 PROCEDURE — 3008F BODY MASS INDEX DOCD: CPT | Mod: CPTII,,, | Performed by: PSYCHIATRY & NEUROLOGY

## 2024-02-29 PROCEDURE — 1159F MED LIST DOCD IN RCRD: CPT | Mod: CPTII,,, | Performed by: PSYCHIATRY & NEUROLOGY

## 2024-02-29 PROCEDURE — 99215 OFFICE O/P EST HI 40 MIN: CPT | Mod: S$PBB,,, | Performed by: PSYCHIATRY & NEUROLOGY

## 2024-02-29 RX ORDER — PRAMIPEXOLE DIHYDROCHLORIDE 0.25 MG/1
TABLET ORAL
Qty: 138 TABLET | Refills: 0 | Status: SHIPPED | OUTPATIENT
Start: 2024-02-29 | End: 2024-02-29

## 2024-02-29 RX ORDER — PRAMIPEXOLE DIHYDROCHLORIDE 0.25 MG/1
TABLET ORAL
Qty: 163 TABLET | Refills: 4 | Status: SHIPPED | OUTPATIENT
Start: 2024-02-29 | End: 2024-06-19 | Stop reason: ALTCHOICE

## 2024-02-29 RX ORDER — PRAMIPEXOLE DIHYDROCHLORIDE 0.25 MG/1
TABLET ORAL
Qty: 46 TABLET | Refills: 0 | Status: SHIPPED | OUTPATIENT
Start: 2024-02-29 | End: 2024-02-29

## 2024-02-29 RX ORDER — PRAMIPEXOLE DIHYDROCHLORIDE 0.25 MG/1
0.25 TABLET ORAL 2 TIMES DAILY
Qty: 60 TABLET | Refills: 3 | Status: SHIPPED | OUTPATIENT
Start: 2024-03-30 | End: 2024-06-19 | Stop reason: SDUPTHER

## 2024-02-29 RX ORDER — SELEGILINE HYDROCHLORIDE 5 MG/1
5 TABLET ORAL 2 TIMES DAILY WITH MEALS
Qty: 180 TABLET | Refills: 1 | Status: SHIPPED | OUTPATIENT
Start: 2024-02-29 | End: 2024-06-19 | Stop reason: SDUPTHER

## 2024-06-17 NOTE — PROGRESS NOTES
[facility] Neurology Follow Up Office Visit Note    Initial Visit Date: 8/25/2022  Last Visit Date: 2/29/2024  Current Visit Date:  06/19/2024    Chief Complaint:     Chief Complaint   Patient presents with    Parkinson's       History of Present Illness:      This is a 69 y.o. Right hand dominant male with history of alcohol dependence who is referred for Parkinson's disease.  During last visit,  pramipexole 0.25 mg twice a day was started. Sinemet IR 10 mg -100 mg was discontinued as patient was unable to tolerate medication.     Age of Onset: 65 years old      Description of movements: Right arm resting tremor along with use followed by left arm. Family noticed that he had been shuffling his feet for at least 2 years. Now with leg involvement. Denied any hallucinations. Complaining of rigidity towards the end of the day.      Exacerbation factors: denied      Relieving factors: calm.      Associated Symptoms:  Changes in smell or taste: No   Dysphagia: No   Voice/phonation change: Yes shaky    Changes in gait/falls:  Yes as above    Orthostatic hypotension:  Would get dizzy at night time when he gets up to use the restroom   Sleep disorder: Yes would toss and turn   Visual Hallucinations/Delusions: No   Mood disturbance: Yes Anxiety disorder.    Cognitive decline: No    Handwriting changes: Yes erratic     Trouble using a fork: No   Trouble using a spoon: No   Trouble drinking out of a cup: Yes occasional       Risk Factors:   Family history of movement disorder: No   Cardiovascular risk factors: Not Applicable   CNS Focal lesions:No   Mood disorder: Yes Anxiety disorder   Antipsychotics/Mood stabilizer/Antidepressant/Traditional antiemetic exposure: No   Stimulant use: No    Tobacco use: Yes 1/2 ppd since age of 22 years old. Quit smoking around 9 months ago.   Alcohol use: Yes History of alcohol dependence. Had been sober for 1 year    Recreational drug use: No    Herbicide exposure: No   Agent Orange exposure: Not  Applicable     Medications:     Current:   Valium 5 mg BID   Selegiline 5 mg twice a day (7/11/2023 to present)  Pramipexole 0.25 mg twice a day (3/15/2024 to present)     Prior:   Sinemet instant release 25 mg-100 mg 2 tablet 3 times a day (3/7/2023 to 7/11/2023): hyperkinesia  Carbidopa - Levodopa IR 10 mg - 100 mg 1 tablet twice a day (10/25/2023 to present): not taking     Devices/Interventions:     DBS:   Gamma knife/Radiofrequency ablation:   PT/OT:     Labs:     No results found for this or any previous visit.    Studies:      Imaging:   MRI Brain:     Genetic Testing: Not Applicable      Review of Systems:     All other systems reviewed and are negative.    Physical Exams:     Vitals:    06/19/24 0844   BP: 134/77   Pulse: 98   Resp: 12   Temp: 97.9 °F (36.6 °C)       Vitals and nursing note reviewed.   Constitutional:       Appearance: Normal appearance.   HENT:      Head: Normocephalic and atraumatic.      Nose: Nose normal.      Mouth/Throat:      Mouth: Mucous membranes are moist.      Pharynx: Oropharynx is clear.   Eyes:      Conjunctiva/sclera: Conjunctivae normal.   Cardiovascular:      Rate and Rhythm: Normal rate and regular rhythm.      Pulses: Normal pulses.      Heart sounds: Normal heart sounds.   Pulmonary:      Effort: Pulmonary effort is normal.      Breath sounds: Normal breath sounds.   Abdominal:      General: Abdomen is flat.      Palpations: Abdomen is soft.   Musculoskeletal:         General: Normal range of motion.      Cervical back: Normal range of motion.   Neurological:      Mental Status: He is alert.   Psychiatric:         Mood and Affect: Mood normal.         Comprehensive Neurological Exam:  Mental Status: Alert Oriented to Self, Date, and Place.   CN II - XII: KYLEIGH, No APD, Fundus wnl OU, VA grossly intact to finger counting at 6 ft, VFFC, No ptosis OU, EOMI without nystagmus, LT/Temp symmetric in CN V1-3 distribution, Hearing grossly intact, Face Symmetric, Tongue and Uvula  midline, Trapezius symmetric bilateral. + Hypomimia.   Motor: rigidity in RUE>LUE, RLE tone, and bulk wnl throughout, Resting pill rolling tremor RUE>LUE, RLE, postural re-emergence tremor in RUE at 30 seconds,  5/5 to confrontation, Bradykinetic bilateral UE>LE, No pronator drift.   Sensory: LT, Proprioception, Vibration, PP symmetric.   Reflexes: 1+ throughout  Cerebellar: FNF wnl b/l  Romberg: Negative  Gait: Decreased ground clearance and stride. Decreased arm swing bilaterally. 5 step en bloc turn.     Assessment:     This is a 69 y.o. Right hand dominant male with history of alcohol dependence who is referred for PD.      Problem List Items Addressed This Visit          Neuro    Parkinson's disease - Primary (Chronic)     Plan:     [] continue with selegiline 5 mg twice a day   [] increase pramipexole to 0.5 mg twice a day for 30 days then 0.5 mg three times a day thereafter     RTC 3 Months     Visit today is associated with current or anticipated ongoing medical care related to this patient's single serious condition/complex condition as documented above.     I have explained the treatment plan, diagnosis, and prognosis to patient. All questions are answered to the best of my knowledge.     Face to face time 40 minutes, including documentation, chart review, counseling, education, review of test results, relevant medical records, and coordination of care.

## 2024-06-19 ENCOUNTER — OFFICE VISIT (OUTPATIENT)
Dept: NEUROLOGY | Facility: CLINIC | Age: 69
End: 2024-06-19
Payer: MEDICARE

## 2024-06-19 VITALS
OXYGEN SATURATION: 99 % | DIASTOLIC BLOOD PRESSURE: 77 MMHG | HEART RATE: 98 BPM | HEIGHT: 63 IN | RESPIRATION RATE: 12 BRPM | WEIGHT: 104.75 LBS | TEMPERATURE: 98 F | BODY MASS INDEX: 18.56 KG/M2 | SYSTOLIC BLOOD PRESSURE: 134 MMHG

## 2024-06-19 DIAGNOSIS — G20.A2 PARKINSON'S DISEASE WITHOUT DYSKINESIA, WITH FLUCTUATING MANIFESTATIONS: Primary | ICD-10-CM

## 2024-06-19 PROCEDURE — 3078F DIAST BP <80 MM HG: CPT | Mod: CPTII,,, | Performed by: PSYCHIATRY & NEUROLOGY

## 2024-06-19 PROCEDURE — 3075F SYST BP GE 130 - 139MM HG: CPT | Mod: CPTII,,, | Performed by: PSYCHIATRY & NEUROLOGY

## 2024-06-19 PROCEDURE — 1159F MED LIST DOCD IN RCRD: CPT | Mod: CPTII,,, | Performed by: PSYCHIATRY & NEUROLOGY

## 2024-06-19 PROCEDURE — 3008F BODY MASS INDEX DOCD: CPT | Mod: CPTII,,, | Performed by: PSYCHIATRY & NEUROLOGY

## 2024-06-19 PROCEDURE — 99215 OFFICE O/P EST HI 40 MIN: CPT | Mod: S$PBB,,, | Performed by: PSYCHIATRY & NEUROLOGY

## 2024-06-19 PROCEDURE — G2211 COMPLEX E/M VISIT ADD ON: HCPCS | Mod: S$PBB,,, | Performed by: PSYCHIATRY & NEUROLOGY

## 2024-06-19 PROCEDURE — 99213 OFFICE O/P EST LOW 20 MIN: CPT | Mod: PBBFAC | Performed by: PSYCHIATRY & NEUROLOGY

## 2024-06-19 RX ORDER — PRAMIPEXOLE DIHYDROCHLORIDE 0.5 MG/1
TABLET ORAL
Qty: 240 TABLET | Refills: 0 | Status: SHIPPED | OUTPATIENT
Start: 2024-06-19 | End: 2024-09-17

## 2024-06-19 RX ORDER — SELEGILINE HYDROCHLORIDE 5 MG/1
5 TABLET ORAL 2 TIMES DAILY WITH MEALS
Qty: 180 TABLET | Refills: 1 | Status: SHIPPED | OUTPATIENT
Start: 2024-06-19 | End: 2025-06-19

## 2024-06-19 RX ORDER — PRAMIPEXOLE DIHYDROCHLORIDE 0.5 MG/1
0.5 TABLET ORAL 3 TIMES DAILY
Qty: 270 TABLET | Refills: 0 | Status: SHIPPED | OUTPATIENT
Start: 2024-09-17 | End: 2024-12-16

## 2024-09-10 DIAGNOSIS — G20.A1 PARKINSON'S DISEASE: ICD-10-CM

## 2024-09-10 RX ORDER — CARBIDOPA AND LEVODOPA 25; 100 MG/1; MG/1
2 TABLET ORAL 3 TIMES DAILY
Qty: 540 TABLET | Refills: 0 | Status: SHIPPED | OUTPATIENT
Start: 2024-09-10

## 2024-10-31 ENCOUNTER — OFFICE VISIT (OUTPATIENT)
Dept: NEUROLOGY | Facility: CLINIC | Age: 69
End: 2024-10-31
Payer: MEDICARE

## 2024-10-31 VITALS
RESPIRATION RATE: 16 BRPM | TEMPERATURE: 98 F | BODY MASS INDEX: 18.18 KG/M2 | SYSTOLIC BLOOD PRESSURE: 120 MMHG | HEART RATE: 96 BPM | DIASTOLIC BLOOD PRESSURE: 72 MMHG | WEIGHT: 102.63 LBS | OXYGEN SATURATION: 99 % | HEIGHT: 63 IN

## 2024-10-31 DIAGNOSIS — G20.A2 PARKINSON'S DISEASE WITHOUT DYSKINESIA, WITH FLUCTUATING MANIFESTATIONS: Primary | ICD-10-CM

## 2024-10-31 PROCEDURE — 3078F DIAST BP <80 MM HG: CPT | Mod: CPTII,,, | Performed by: PSYCHIATRY & NEUROLOGY

## 2024-10-31 PROCEDURE — 99213 OFFICE O/P EST LOW 20 MIN: CPT | Mod: PBBFAC | Performed by: PSYCHIATRY & NEUROLOGY

## 2024-10-31 PROCEDURE — G2211 COMPLEX E/M VISIT ADD ON: HCPCS | Mod: S$PBB,,, | Performed by: PSYCHIATRY & NEUROLOGY

## 2024-10-31 PROCEDURE — 1159F MED LIST DOCD IN RCRD: CPT | Mod: CPTII,,, | Performed by: PSYCHIATRY & NEUROLOGY

## 2024-10-31 PROCEDURE — 3288F FALL RISK ASSESSMENT DOCD: CPT | Mod: CPTII,,, | Performed by: PSYCHIATRY & NEUROLOGY

## 2024-10-31 PROCEDURE — 3008F BODY MASS INDEX DOCD: CPT | Mod: CPTII,,, | Performed by: PSYCHIATRY & NEUROLOGY

## 2024-10-31 PROCEDURE — 1126F AMNT PAIN NOTED NONE PRSNT: CPT | Mod: CPTII,,, | Performed by: PSYCHIATRY & NEUROLOGY

## 2024-10-31 PROCEDURE — 99214 OFFICE O/P EST MOD 30 MIN: CPT | Mod: S$PBB,,, | Performed by: PSYCHIATRY & NEUROLOGY

## 2024-10-31 PROCEDURE — 1160F RVW MEDS BY RX/DR IN RCRD: CPT | Mod: CPTII,,, | Performed by: PSYCHIATRY & NEUROLOGY

## 2024-10-31 PROCEDURE — 1100F PTFALLS ASSESS-DOCD GE2>/YR: CPT | Mod: CPTII,,, | Performed by: PSYCHIATRY & NEUROLOGY

## 2024-10-31 PROCEDURE — 3074F SYST BP LT 130 MM HG: CPT | Mod: CPTII,,, | Performed by: PSYCHIATRY & NEUROLOGY

## 2024-10-31 RX ORDER — SELEGILINE HYDROCHLORIDE 5 MG/1
5 TABLET ORAL 2 TIMES DAILY WITH MEALS
Qty: 180 TABLET | Refills: 1 | Status: SHIPPED | OUTPATIENT
Start: 2024-10-31 | End: 2025-10-31

## 2024-10-31 RX ORDER — SELEGILINE HYDROCHLORIDE 5 MG/1
5 TABLET ORAL 2 TIMES DAILY WITH MEALS
Qty: 180 TABLET | Refills: 1 | Status: SHIPPED | OUTPATIENT
Start: 2024-10-31 | End: 2024-10-31 | Stop reason: SDUPTHER

## 2024-10-31 RX ORDER — PRAMIPEXOLE DIHYDROCHLORIDE 0.5 MG/1
0.5 TABLET ORAL 3 TIMES DAILY
Qty: 270 TABLET | Refills: 0 | Status: SHIPPED | OUTPATIENT
Start: 2024-10-31 | End: 2024-10-31 | Stop reason: SDUPTHER

## 2024-10-31 RX ORDER — PRAMIPEXOLE DIHYDROCHLORIDE 0.5 MG/1
0.5 TABLET ORAL 3 TIMES DAILY
Qty: 270 TABLET | Refills: 0 | Status: SHIPPED | OUTPATIENT
Start: 2024-10-31 | End: 2025-01-29

## 2024-11-14 DIAGNOSIS — G20.A2 PARKINSON'S DISEASE WITHOUT DYSKINESIA, WITH FLUCTUATING MANIFESTATIONS: ICD-10-CM

## 2024-11-14 RX ORDER — SELEGILINE HYDROCHLORIDE 5 MG/1
5 TABLET ORAL 2 TIMES DAILY WITH MEALS
Qty: 180 TABLET | Refills: 1 | Status: SHIPPED | OUTPATIENT
Start: 2024-11-14 | End: 2025-11-14

## 2025-03-10 DIAGNOSIS — G20.A2 PARKINSON'S DISEASE WITHOUT DYSKINESIA, WITH FLUCTUATING MANIFESTATIONS: Primary | ICD-10-CM

## 2025-03-11 RX ORDER — PRAMIPEXOLE DIHYDROCHLORIDE 0.5 MG/1
0.5 TABLET ORAL 3 TIMES DAILY
Qty: 270 TABLET | Refills: 0 | Status: SHIPPED | OUTPATIENT
Start: 2025-03-11 | End: 2025-06-09

## 2025-05-23 DIAGNOSIS — G20.A2 PARKINSON'S DISEASE WITHOUT DYSKINESIA, WITH FLUCTUATING MANIFESTATIONS: ICD-10-CM

## 2025-05-23 RX ORDER — SELEGILINE HYDROCHLORIDE 5 MG/1
5 TABLET ORAL 2 TIMES DAILY WITH MEALS
Qty: 60 TABLET | Refills: 0 | Status: SHIPPED | OUTPATIENT
Start: 2025-05-23 | End: 2025-06-22

## 2025-05-23 NOTE — TELEPHONE ENCOUNTER
Refill request via fax for selegiline HCl (ELDEPRYL) 5 mg tablet to [id number] - [place], [place] - [address] AT [facility] [address]      LOV: 10/31/24  NOV: 06/25/25

## 2025-06-05 DIAGNOSIS — G20.A2 PARKINSON'S DISEASE WITHOUT DYSKINESIA, WITH FLUCTUATING MANIFESTATIONS: ICD-10-CM

## 2025-06-05 RX ORDER — PRAMIPEXOLE DIHYDROCHLORIDE 0.5 MG/1
0.5 TABLET ORAL 3 TIMES DAILY
Qty: 270 TABLET | Refills: 0 | Status: SHIPPED | OUTPATIENT
Start: 2025-06-05 | End: 2025-09-03

## 2025-06-23 NOTE — PROGRESS NOTES
[facility] Neurology Follow Up Office Visit Note    Initial Visit Date: 8/25/2022  Last Visit Date: 10/31/2024  Current Visit Date:  06/25/2025    Chief Complaint:     Chief Complaint   Patient presents with    Parkinson's disease without dyskinesia, with fluctuating ma     Patient reports no changes.        History of Present Illness:      This is a 70 y.o. Right hand dominant male with history of alcohol dependence who is referred for Parkinson's disease.  During last visit,  pramipexole was increased to 0.5 mg three times a day. States that PCP had stopped one of his parkinson medication due to increased heart rate, but it appears that he is still taking pramipexole. It appears that PCP had been refilling Sinemet, which has been discontinued since 2/9/2024. He complains of SOB and palpitations with exertion.     Age of Onset: 65 years old      Description of movements: Right arm resting tremor along with use followed by left arm. Family noticed that he had been shuffling his feet for at least 2 years. Now with leg involvement. Denied any hallucinations. Complaining of rigidity towards the end of the day.      Exacerbation factors: denied      Relieving factors: calm.      Associated Symptoms:  Changes in smell or taste: No   Dysphagia: No   Voice/phonation change: Yes shaky    Changes in gait/falls:  Yes as above    Orthostatic hypotension:  Would get dizzy at night time when he gets up to use the restroom   Sleep disorder: Yes would toss and turn   Visual Hallucinations/Delusions: No   Mood disturbance: Yes Anxiety disorder.    Cognitive decline: No    Handwriting changes: Yes erratic     Trouble using a fork: No   Trouble using a spoon: No   Trouble drinking out of a cup: Yes occasional       Risk Factors:   Family history of movement disorder: No   Cardiovascular risk factors: Not Applicable   CNS Focal lesions:No   Mood disorder: Yes Anxiety disorder   Antipsychotics/Mood stabilizer/Antidepressant/Traditional  antiemetic exposure: No   Stimulant use: No    Tobacco use: Yes 1/2 ppd since age of 22 years old. Quit smoking around 9 months ago.   Alcohol use: Yes History of alcohol dependence. Had been sober for 1 year    Recreational drug use: No    Herbicide exposure: No   Agent Orange exposure: Not Applicable     Medications:     Current:   Valium 5 mg BID   Selegiline 5 mg twice a day (7/11/2023 to present)  Pramipexole 0.5 mg three times a day (10/31/2024 to present):     Prior:   Sinemet instant release 25 mg-100 mg 2 tablet 3 times a day (3/7/2023 to 7/11/2023): hyperkinesia  Carbidopa - Levodopa IR 10 mg - 100 mg 1 tablet twice a day (10/25/2023 to 2/29/2024): not taking due to drowsiness and nausea.    Devices/Interventions:     DBS:   Gamma knife/Radiofrequency ablation:   PT/OT:     Labs:     No results found for this or any previous visit.    Studies:      Imaging:   MRI Brain:     Genetic Testing: Not Applicable      Review of Systems:     All other systems reviewed and are negative.    Physical Exams:     Vitals:    06/25/25 1432   BP: (!) 148/88   Pulse:    Resp:    Temp:            Vitals and nursing note reviewed.   Constitutional:       Appearance: Normal appearance.   HENT:      Head: Normocephalic and atraumatic.      Nose: Nose normal.      Mouth/Throat:      Mouth: Mucous membranes are moist.      Pharynx: Oropharynx is clear.   Eyes:      Conjunctiva/sclera: Conjunctivae normal.   Cardiovascular:      Rate and Rhythm: Normal rate and regular rhythm.      Pulses: Normal pulses.      Heart sounds: Normal heart sounds.   Pulmonary:      Effort: Pulmonary effort is normal.      Breath sounds: Normal breath sounds.   Abdominal:      General: Abdomen is flat.      Palpations: Abdomen is soft.   Musculoskeletal:         General: Normal range of motion.      Cervical back: Normal range of motion.   Neurological:      Mental Status: He is alert.   Psychiatric:         Mood and Affect: Mood normal.          Comprehensive Neurological Exam:  Mental Status: Alert Oriented to Self, Date, and Place.   CN II - XII: KYLEIGH, No APD, Fundus wnl OU, VA grossly intact to finger counting at 6 ft, VFFC, No ptosis OU, EOMI without nystagmus, LT/Temp symmetric in CN V1-3 distribution, Hearing grossly intact, Face Symmetric, Tongue and Uvula midline, Trapezius symmetric bilateral. + Hypomimia.   Motor: rigidity in RUE>LUE, RLE tone, and bulk wnl throughout, Resting pill rolling tremor RUE>LUE, RLE, postural re-emergence tremor in RUE at 30 seconds,  5/5 to confrontation, Bradykinetic bilateral UE>LE, No pronator drift.   Sensory: LT, Proprioception, Vibration, PP symmetric.   Reflexes: 1+ throughout  Cerebellar: FNF wnl b/l  Romberg: Negative  Gait: Decreased ground clearance and stride. Decreased arm swing bilaterally. 5 step en bloc turn.     Assessment:     This is a 70 y.o. Right hand dominant male with history of alcohol dependence who is referred for PD. Does not wish to adjustment medications at this point in time.      Problem List Items Addressed This Visit          Neuro    Parkinson's disease - Primary (Chronic)       Plan:     [] continue with selegiline 5 mg twice a day   [] continue with pramipexole 0.5 mg three times a day  [] stop Sinemet.    RTC 3 Months     Visit today is associated with current or anticipated ongoing medical care related to this patient's single serious condition/complex condition as documented above.     I have explained the treatment plan, diagnosis, and prognosis to patient. All questions are answered to the best of my knowledge.     Face to face time 30 minutes, including documentation, chart review, counseling, education, review of test results, relevant medical records, and coordination of care.

## 2025-06-25 ENCOUNTER — OFFICE VISIT (OUTPATIENT)
Dept: NEUROLOGY | Facility: CLINIC | Age: 70
End: 2025-06-25
Payer: MEDICARE

## 2025-06-25 VITALS
TEMPERATURE: 98 F | HEART RATE: 94 BPM | WEIGHT: 107 LBS | BODY MASS INDEX: 18.96 KG/M2 | DIASTOLIC BLOOD PRESSURE: 88 MMHG | RESPIRATION RATE: 16 BRPM | SYSTOLIC BLOOD PRESSURE: 148 MMHG | HEIGHT: 63 IN | OXYGEN SATURATION: 99 %

## 2025-06-25 DIAGNOSIS — G20.A2 PARKINSON'S DISEASE WITHOUT DYSKINESIA, WITH FLUCTUATING MANIFESTATIONS: ICD-10-CM

## 2025-06-25 DIAGNOSIS — G20.A1 PARKINSON'S DISEASE WITHOUT DYSKINESIA OR FLUCTUATING MANIFESTATIONS: Primary | ICD-10-CM

## 2025-06-25 PROCEDURE — 3079F DIAST BP 80-89 MM HG: CPT | Mod: CPTII,,, | Performed by: PSYCHIATRY & NEUROLOGY

## 2025-06-25 PROCEDURE — 1126F AMNT PAIN NOTED NONE PRSNT: CPT | Mod: CPTII,,, | Performed by: PSYCHIATRY & NEUROLOGY

## 2025-06-25 PROCEDURE — 99214 OFFICE O/P EST MOD 30 MIN: CPT | Mod: S$PBB,,, | Performed by: PSYCHIATRY & NEUROLOGY

## 2025-06-25 PROCEDURE — 1159F MED LIST DOCD IN RCRD: CPT | Mod: CPTII,,, | Performed by: PSYCHIATRY & NEUROLOGY

## 2025-06-25 PROCEDURE — G2211 COMPLEX E/M VISIT ADD ON: HCPCS | Mod: ,,, | Performed by: PSYCHIATRY & NEUROLOGY

## 2025-06-25 PROCEDURE — 99214 OFFICE O/P EST MOD 30 MIN: CPT | Mod: PBBFAC | Performed by: PSYCHIATRY & NEUROLOGY

## 2025-06-25 PROCEDURE — 3288F FALL RISK ASSESSMENT DOCD: CPT | Mod: CPTII,,, | Performed by: PSYCHIATRY & NEUROLOGY

## 2025-06-25 PROCEDURE — 1101F PT FALLS ASSESS-DOCD LE1/YR: CPT | Mod: CPTII,,, | Performed by: PSYCHIATRY & NEUROLOGY

## 2025-06-25 PROCEDURE — 3077F SYST BP >= 140 MM HG: CPT | Mod: CPTII,,, | Performed by: PSYCHIATRY & NEUROLOGY

## 2025-06-25 PROCEDURE — 3008F BODY MASS INDEX DOCD: CPT | Mod: CPTII,,, | Performed by: PSYCHIATRY & NEUROLOGY

## 2025-06-25 RX ORDER — SELEGILINE HYDROCHLORIDE 5 MG/1
5 TABLET ORAL 2 TIMES DAILY WITH MEALS
Qty: 180 TABLET | Refills: 1 | Status: SHIPPED | OUTPATIENT
Start: 2025-06-25 | End: 2025-12-22

## 2025-06-25 RX ORDER — PRAMIPEXOLE DIHYDROCHLORIDE 0.5 MG/1
0.5 TABLET ORAL 3 TIMES DAILY
Qty: 270 TABLET | Refills: 1 | Status: SHIPPED | OUTPATIENT
Start: 2025-06-25 | End: 2025-12-22

## 2025-06-25 RX ORDER — ONDANSETRON 4 MG/1
4 TABLET, FILM COATED ORAL 2 TIMES DAILY PRN
COMMUNITY
Start: 2025-01-03

## 2025-06-25 RX ORDER — CARBIDOPA AND LEVODOPA 25; 100 MG/1; MG/1
2 TABLET ORAL 3 TIMES DAILY
COMMUNITY
Start: 2025-01-07 | End: 2025-06-25

## 2025-08-23 DIAGNOSIS — G20.A2 PARKINSON'S DISEASE WITHOUT DYSKINESIA, WITH FLUCTUATING MANIFESTATIONS: ICD-10-CM

## 2025-08-25 RX ORDER — SELEGILINE HYDROCHLORIDE 5 MG/1
TABLET ORAL
Qty: 180 TABLET | Refills: 1 | Status: SHIPPED | OUTPATIENT
Start: 2025-08-25